# Patient Record
Sex: FEMALE | Race: WHITE
[De-identification: names, ages, dates, MRNs, and addresses within clinical notes are randomized per-mention and may not be internally consistent; named-entity substitution may affect disease eponyms.]

---

## 2019-04-02 ENCOUNTER — HOSPITAL ENCOUNTER (INPATIENT)
Dept: HOSPITAL 46 - ED | Age: 49
LOS: 2 days | Discharge: HOME | DRG: 415 | End: 2019-04-04
Attending: SURGERY | Admitting: SURGERY
Payer: COMMERCIAL

## 2019-04-02 VITALS — BODY MASS INDEX: 39.17 KG/M2 | HEIGHT: 66 IN | WEIGHT: 243.7 LBS

## 2019-04-02 DIAGNOSIS — K80.00: Primary | ICD-10-CM

## 2019-04-02 DIAGNOSIS — I97.191: ICD-10-CM

## 2019-04-02 DIAGNOSIS — Z53.31: ICD-10-CM

## 2019-04-02 DIAGNOSIS — E66.01: ICD-10-CM

## 2019-04-02 DIAGNOSIS — I48.1: ICD-10-CM

## 2019-04-02 DIAGNOSIS — E55.9: ICD-10-CM

## 2019-04-02 DIAGNOSIS — Z79.899: ICD-10-CM

## 2019-04-02 DIAGNOSIS — E87.6: ICD-10-CM

## 2019-04-02 DIAGNOSIS — E83.42: ICD-10-CM

## 2019-04-02 DIAGNOSIS — K76.0: ICD-10-CM

## 2019-04-02 DIAGNOSIS — Z82.49: ICD-10-CM

## 2019-04-02 DIAGNOSIS — E03.9: ICD-10-CM

## 2019-04-02 PROCEDURE — 0FJ44ZZ INSPECTION OF GALLBLADDER, PERCUTANEOUS ENDOSCOPIC APPROACH: ICD-10-PCS | Performed by: SURGERY

## 2019-04-02 PROCEDURE — G0378 HOSPITAL OBSERVATION PER HR: HCPCS

## 2019-04-02 PROCEDURE — BF101ZZ FLUOROSCOPY OF BILE DUCTS USING LOW OSMOLAR CONTRAST: ICD-10-PCS | Performed by: SURGERY

## 2019-04-02 PROCEDURE — 0FT40ZZ RESECTION OF GALLBLADDER, OPEN APPROACH: ICD-10-PCS | Performed by: SURGERY

## 2019-04-02 NOTE — NUR
PT TO THE FLOOR, C/O 7/10 PAIN IN RUQ, PT STATED THAT SHE HAD AN EPISODE OF
EMESIS AS WELL, GREEN BILE NOTED, PT STATES HER NAUSEA "COMES AND GOES" PT
GIVEN PRN PAIN MEDICATION PER EMAR, TOLERATED WELL. LS CLEAR, HEART SOUNDS
REGULAR, BT HYPOACTIVE, NO C/O SOB/CP, VSS. IV FLUSHES WELL.

## 2019-04-02 NOTE — NUR
04/02/19 1710 Tiffanie Kaiser 1613 PT ARRIVED IN PACU SLEEPY AND PLACED ON HEART MONITOR SHOWING
A-FIB. SPOKE WITH DR AND NEW ORDERS RECEIVED. 1630 EKG DONE BY RT. PT
STATES "I FEEL BETTER NOW. MY PAIN IS A 2/10, BEFORE SURGERY IT WAS AN
8/10." 1635 LABS DRAWN. PT AWAKENS AND FALLS BACK TO SLEEP. 1645
PILLOW TO ABD TO HELP SPLINT WHEN COUGHING. PT RELAXED TALKING TO
STAFF. 1700 TO ROOM 114. REPORT GIVEN TO COLTEN MORRELL.

## 2019-04-02 NOTE — DS
[facility]
                                    [address], [place]  [address]
_________________________________________________________________________________________
                                                                 Draft    
 
 
ADMISSION DATE:  04/02/2019
 
DISCHARGE DATE:  04/04/2019
 
REASON FOR ADMISSION:
This obese 49-year-old white woman works as a  at [employer].
She has had episodes of right upper abdominal pain episodically for several months and
more recently for two continuous weeks .  In the preceding several hours prior to
presentation to the emergency room, she had unrelenting right subcostal pain and
presented to the emergency room where she was evaluated by Dr. Isbell.  This included a
gallbladder ultrasound confirming a very thickened gallbladder wall, gallstone or
gallstones, and findings consistent with acute calculous cholecystitis.  She is admitted
for further evaluation and care. 
 
PERTINENT PHYSICAL EXAM:
GENERAL:  Showed an obese white woman who was quite uncomfortable.  She had a persistent
nausea and some vomiting. 
HEENT:  Trachea is midline.  Mucous membranes were dry. 
CHEST:  Clear. 
HEART:  Regular without murmur. 
ABDOMEN:  Nondistended.  There is marked tenderness in right subcostal area. 
EXTREMITIES:  Showed no clubbing, cyanosis, or edema.
 
LABORATORY STUDIES:
Showed normal electrolytes.  White count 10, hematocrit 40.9, platelets 343,000.
Alkaline phosphatase 99, bilirubin 0.5, AST 18, ALT 17.  Ultrasound did confirm a very
thick walled gallbladder and marked chatter related to gallstone or gallstones and fatty
infiltration of the liver. 
 
HOSPITAL COURSE:
She was aggressively fluid resuscitated, given antibiotics and parenteral pain
medication.  Despite this, she was quite uncomfortable.  On April 2, 2019, she underwent
laparoscopy with attempted cholecystectomy, however, profound inflammation was noted and
minimal decompression of the gallbladder could be obtained laparoscopically.  The
gallbladder could not even be grasped or elevated.  Ultimately, conversion to open
operation was undertaken.  There she was found to have a very thickened gallbladder
wall.  Inflammation was quite profound and a gallstone was at least 5 cm in size
obstructing much of the gallbladder and wedged into the infundibulum.  Cholangiogram was
found to be normal.  The operation was prolonged, complicated, and difficult based on
the extent of inflammation, her obesity, and other factors. 
 
A drain was placed. 
 
                                                                                    
_________________________________________________________________________________________
PATIENT NAME:     SLAVADOR RUIZ                    
MEDICAL RECORD #: [id number]            DISCHARGE SUMMARY             
          ACCT #: [id number]  
DATE OF BIRTH:   [birth year]            REPORT #: [id number]      
PHYSICIAN:        HANNAH VALENCIA MD                 
PCP:              NICK MONTEIRO MD           
REPORT IS CONFIDENTIAL AND NOT TO BE RELEASED WITHOUT AUTHORIZATION
 
 
                                  [facility]
                                    [address], [place]  [address]
_________________________________________________________________________________________
                                                                 Draft    
 
 
 
Postoperatively, she had remarkable improvement of her complaint of pain and tenderness.
 The drain showed only serosanguineous fluid.  Notably, through the course of operation,
she developed atrial fibrillation.  This was not particularly a rapid ventricular rate,
only in the 70s to 80s actually.  Consultation was undertaken with Dr. Rebolledo the
following morning after improving her electrolytes including hypomagnesemia and
low-grade hypokalemia.  This did not allow for conversion to a sinus rhythm.  She was
asymptomatic with it. 
 
She is noted to have extensive family history of cardiac problems including myocardial
infarction and many family members with chronic atrial fibrillation.  On that basis,
metoprolol-XL 25 mg daily was initiated as was aspirin 325 mg daily. 
 
Her heart rate was controlled in the mid 80s, having been as high as 110 on telemetry
postop. 
 
Her drain was removed prior to discharge.  At the time of discharge, she is ambulating
well, tolerating a regular diet, has marked improvement of her symptoms of pain and
doing well.  She is anticipating to follow up with her primary provider, LINDA Chun, on Monday.  Dr. Rebolledo the hospitalist has advised her to follow up as regard to a
cardiac situation with her.  Whether or not cardioversion would be anticipated would be
dependent on a cardiologist's intervention I am sure. 
 
She is asked to avoid lifting more than 20 pounds for the next month based on her
incisions.  She is permitted to shower.  She will keep Steri-Strips on the incisions.
The drain has been removed. 
 
DISCHARGE MEDICATIONS:
Will include:
1. Toprol XL 25 mg p.o. daily #90.
2. Aspirin 325 mg one tablet p.o. daily #90.
3. Ibuprofen 600 mg p.o. q.6 hours p.r.n. moderate-to-severe abdominal pain.
4. Percocet 7.5/325 one tab p.o. q.4 hours p.r.n. pain #6.
5. Tylenol 325 mg two tablets p.o. q.6 hours p.r.n. moderate pain instead of Percocet.
6. Continue Synthroid 125 mcg p.o. daily.
7. Vitamin D3 50,000 unit capsule weekly.
8. Prozac 20 mg p.o. daily.
 
DISCHARGE DIAGNOSES:
1. Severe calculous acute occult cholecystitis, status post laparoscopy with conversion
to open cholecystectomy with intraoperative cholangiogram on April 2, 2019. 
2. Obesity.
 
                                                                                    
_________________________________________________________________________________________
PATIENT NAME:     SALVADOR RUIZ                    
MEDICAL RECORD #: [id number]            DISCHARGE SUMMARY             
          ACCT #: [id number]  
DATE OF BIRTH:   [birth year]            REPORT #: [id number]      
PHYSICIAN:        HANNAH VALENCIA MD                 
PCP:              NICK MONTEIRO MD           
REPORT IS CONFIDENTIAL AND NOT TO BE RELEASED WITHOUT AUTHORIZATION
 
 
                                  [facility]
                                    [address]  [address]
_________________________________________________________________________________________
                                                                 Draft    
 
 
3. Postoperative development of persistent atrial fibrillation, unchanged despite
electrolyte correction. 
4. Hypothyroidism.
5. Vitamin D deficiency.
 
FOLLOWUP PLAN:
She is return to see me in approximately 4 weeks.  She should lift no more than 20
pounds 
in the next 4 weeks.  She is permitted to shower and will keep Steri-Strips on.  She is
planning to see her primary care provider on Monday this coming, LINDA Chun. 
 
 
 
            ________________________________________
            MD CELIA Willingham/NELLY
Job #:  [id number]
DD:  04/04/2019 11:14:14
DT:  04/04/2019 18:20:15
 
cc:            LINDA Chun DO
 
 
Copies:  ANEESH ISBELL DO
~
 
 
 
 
 
 
 
 
 
 
 
 
 
 
                                                                                    
_________________________________________________________________________________________
PATIENT NAME:     SALVADOR RUIZ                    
MEDICAL RECORD #: [id number]            DISCHARGE SUMMARY             
          ACCT #: [id number]  
DATE OF BIRTH:   [birth year]            REPORT #: [id number]      
PHYSICIAN:        HANNAH VALENCIA MD                 
PCP:              NICK MONTEIRO MD           
REPORT IS CONFIDENTIAL AND NOT TO BE RELEASED WITHOUT AUTHORIZATION

## 2019-04-02 NOTE — NUR
PT GIVEN PRN MORPHINE FOR 7/10 PAIN, PT CONTINUES TO HAVE EMESIS AS WELL AS
NAUSEA, DR. VALENCIA NOTIFIED AND RECEIVED NEW ORDER FOR PHENERGAN PRN 12.5MG IV
Q6. REPORT GIVEN TO JORDAN ABEL, QUESTIONS ANSWERED.

## 2019-04-02 NOTE — NUR
PT RESTLESS IN BED, TAKING DEEP BREATHS, GRIMACING. RATING PAIN 8/10.
MEDICATED WITH 4MG IV MORPHINE. REPORTS NAUSEA AT THIS TIME BUT STATES "WHEN
THE PAIN GETS BAD IS MAKES ME NAUSEOUS." DENIED NEED FOR ANTIEMETIC AT THIS
TIME STATING THAT THE NAUSEA WILL GET BETTER AFTER PAIN MEDICATION KICKS IN.
 AT BEDSIDE. PT HAS REMAINED NPO. IV INFUSING WNL, FLUSHES EASILY,
DRESSING CDI. CALL LIGHT WITHIN REACH.

## 2019-04-02 NOTE — NUR
PT RESTING IN BED WATCHING TV. VERY DROWSY BUT ORIENTED TO ALL. DENIES PAIN OR
NAUSEA. INCISION TO RIGHT LOWER QUADRANT COVERED WITH MEPILEX AND OPSITE. HAMZAH
SITE TO RLQ, DRESSING CDI, PUTTING OUT SEROSANGUINOUS DRAINAGE. UMBILICAL LAP
SITE WITH STERI STRIPS, SCANT AMOUNT OF BRIGHT RED BLOOD. TELE MONITOR ON, HR
70'S AND IRREGULAR. PT DENIES CHEST PAIN OR DISOMFORT, DENIES DIZZINESS OR
LIGHT HEADEDNESS. PULSE OX ON, SATTING 90-95% ON 2LNC. EDUCATED ON USE OF
I.S.  AT BEDSIDE. CALL LIGHT WITHIN REACH.

## 2019-04-02 NOTE — HP
[facility]
                                    [address], [place]  [address]
_________________________________________________________________________________________
                                                                 Signed   
 
 
ADMISSION DATE:  
04/02/2019
 
REASON FOR ADMISSION:  
Acute calculous cholecystitis.
 
HISTORY OF PRESENT ILLNESS:  
This obese 49-year-old white woman works as a  at [employer].  She has had
episodes of right upper abdominal pain, which have lasted several hours previously, but
last night began at approximately and unrelenting.  She presented at approximately 2
a.m. to the emergency room, where she was evaluated by Dr. Gary.  Her clinical findings
were highly suggestive of cholecystitis and a gallbladder ultrasound was performed,
which confirmed a thick-walled gallbladder stone sludge and positive sonographic Hernandez
sign.  Fatty liver was noted as well.  She was admitted for further evaluation and care. 
 
Her pain has been rather significant and she has had associated nausea, which was nearly
intractable.  Phenergan has reduced her symptoms of nausea.  She has no prior history of
abdominal surgery, though does report prior history of tonsillectomy at age 12.  She is
postmenopausal.  She does have hypothyroidism and takes thyroid medication. 
 
REVIEW OF SYSTEMS:  
She denies any shortness of breath or chest pain.  She has had no dysphagia or dysuria.
Denies any hematemesis or blood per rectum. 
 
PHYSICAL EXAMINATION:
GENERAL:  A very uncomfortable appearing white woman accompanied by her  (a
patient of mine as well).  Mucous membranes are dry.  Trachea is midline.  She has no
carotid bruit. 
CHEST:  Clear. 
HEART:  Regular without murmur. 
ABDOMEN:  Nondistended, but somewhat obese.  She has tenderness in the epigastric and
right subcostal area. 
EXTREMITIES:  Show no clubbing, cyanosis, or edema. 
VITAL SIGNS:  On presentation at 0351 hours showed a temperature of 97.9, pulse 78,
respirations 16, blood pressure 138/93 with a pulse oximetry on room air of 96%. 
 
LABORATORY DATA:  
Showed normal electrolytes.  Creatinine 0.59.  White count 10.0, hematocrit 40.9,
platelets 343,000, alkaline phosphatase 99, bilirubin 0.5, AST 18, ALT 17.  The
ultrasound report has been reviewed and images have also been reviewed showing fatty
liver to be sure a thick-walled gallbladder with marked shadowing related to gallstones. 
 
 
    Electronically Signed By: HANNAH VALENCIA MD  04/02/19 1808
_________________________________________________________________________________________
PATIENT NAME:     SALVADOR RUIZ                    
MEDICAL RECORD #: [id number]            HISTORY AND PHYSICAL          
          ACCT #: [id number]  
DATE OF BIRTH:   [birth year]            REPORT #: [id number]      
PHYSICIAN:        HANNAH VALENCIA MD                 
PCP:              NICK MONTEIRO MD           
REPORT IS CONFIDENTIAL AND NOT TO BE RELEASED WITHOUT AUTHORIZATION
 
 
                                  [facility]
                                    [address], [place]  [address]
_________________________________________________________________________________________
                                                                 Signed   
 
 
ASSESSMENT:  
The patient has rather significant acute severe calculous cholecystitis.  She has had
recurrent bouts leading up to this time.  She needs additional fluids as she is
clinically dehydrated and ultimately cholecystectomy. 
 
I discussed with the patient and her  who attends to her the risk of bleeding,
infection, bile duct injury, need for open procedure, need for common duct exploration 
and other unforeseen complications.  They understand and wished to proceed.  We will
plan to do operation today hopefully. 
 
 
 
            ________________________________________
            MD CELIA Willingham/NELLY
Job #:  [id number]
DD:  04/02/2019 09:44:36
DT:  04/02/2019 16:34:06
 
 
Copies:                                
~
 
 
 
 
 
 
 
 
 
 
 
 
 
 
 
 
 
 
 
    Electronically Signed By: HANNAH VALENCIA MD  04/02/19 1808
_________________________________________________________________________________________
PATIENT NAME:     SALVADOR RUIZ                    
MEDICAL RECORD #: [id number]            HISTORY AND PHYSICAL          
          ACCT #: [id number]  
DATE OF BIRTH:   [birth year]            REPORT #: [id number]      
PHYSICIAN:        HANNAH VALENCIA MD                 
PCP:              NICK MONTEIRO MD           
REPORT IS CONFIDENTIAL AND NOT TO BE RELEASED WITHOUT AUTHORIZATION

## 2019-04-02 NOTE — NUR
MEDICATED WITH 2 PERCOCET 7.5MG PER ABD PAIN 3/10. O2 IN PLACE 2L. IVF
INFUSING, SCDS IN PLACE.TOLERATING FLUIDS WELL FAMILY AT [facility]

## 2019-04-02 NOTE — NUR
PT ADMITTED TO ROOM 114 FROM ED.  SELF TRANSFERED FROM STRETCHER TO BED,
MOANING, DID VOMIT SMALL AMOUNT OF GREEN LIQUID.  STATES SHE HAS BEEN HAVING
STOMACH PAIN FOR "2 WEEKS".  A/O, ROOM AIR.  RESTLESS IN BED.  DENIED NEED TO
USE THE BATHROOM, REQUESTED AND RECEIVED THE REMOTE FOR THE TV/CALL LIGHT.

## 2019-04-02 NOTE — NUR
PT C/O 8/10 PAIN IN RUQ, GIVEN 4MG MORPHINE IVP FOR PRN PAIN, IV FLUIDS
INFUSING PER EMAR WNL, CALL LIGHT WITHIN REACH.

## 2019-04-02 NOTE — NUR
PT GIVEN ZOFRAN PER EMAR FOR NAUSEA/VOMITING, IV FLUIDS INFUSING PER EMAR WNL,
PT CONTINUES TO C/O PAIN IN RUQ, WILL GIVE PRN PAIN MEDICATION, CALL LIGHT
WITHIN REACH.

## 2019-04-02 NOTE — NUR
PT CONT TO RATE PAIN 8/10. MEDICATED WITH IV MORPHINE. PT ANXIOUS IN BED,
MOANING, RESTLESS.  AT BEDSIDE. CALL LIGHT WITHIN REACH.

## 2019-04-02 NOTE — NUR
PT SITTING UP IN BED VISITING WITH FAMILY. DENIES PAIN OR OTHER CONCERNS AT
THIS TIME. ATE SOME JELLO AND JOSSELINE WATER, DOES NOT WANT TO EAT ANYTHING ELSE AT
THIS TIME. CALL LIGHT WITHIN REACH.

## 2019-04-02 NOTE — NUR
PT UP TO BR W MINIMUM OF HELP, VOIDED, BACK TO BED, TOLERATED WELL. COOP WITH
ASSESSMENT. O2 2L NC, CPOX 98%. NO RESP DISTRESS PRESENT WITH EXERTION. ABD
ROSADO, TENDER TO TOUCH, LAP SITES IN PLACE, R MEPILEX INPLACE, CDI, HAMZAH WITH
SANGUINEOUS DRAINAGE PRESENT. SCDS IN PLACE. NO FURTHER C/O PAIN AT THIS TIME.
TOLERATING FLUIDS WELL, CALL LIGHT AT BEDSIDE

## 2019-04-03 NOTE — NUR
PT HAS REDDENED/RASH AREAS TO FOREARMS, AREAS "ITCHY".  NOTIFIED DR. VALENCIA VIA
TELEPHONE.  NOTIFIED HIM THAT PT DID BEGIN TAKING TOPROL XL THIS SHIFT.
RECIEVED TORB FOR BENADRYL 25 MG PO Q 6 HOURS PRN RASH OR ITCHING.  NOTIFIED
DR. BOLES OF ABOVE NOTED VIA TELEPHONE.

## 2019-04-03 NOTE — NUR
PT C/O 5/10 PAIN TO RIGHT ABDOMEN.  GAVE PERCOCET 7.5/325 MG 1 TAB PO PRN.  PT
ATE 75% OF DINNER.  TOLERATED WELL.  VISITING WITH FAMILY AT BEDSIDE.

## 2019-04-03 NOTE — NUR
PT IN BED, RATES PAIN 2/10, REPORTS THIS IS TOLERABLE LEVEL OF PAIN.  MEPILEX
AND OPSITE DRESSING TO RIGHT ABDOMEN C/D/I.  SMALL AMOUNT OF SEROSANGUAINOUS
DRAINAGE IN HAMZAH DRAIN.  PT DENIES PASSING FLATUS YET SINCE SURGERY, BUT BOWEL
TONES ARE NOW ACTIVE X 4 QUADRANTS.

## 2019-04-03 NOTE — NUR
PT ENCOURAGED TO GET UP TO GO FOR WALK IN HALLS.  PT REPORTED PAIN TO ABDOMEN
5/10, REQUESTED PAIN MEDICATION.  GAVE PERCOCET 7.5/325 MG 1 TAB PO PRN.  LAB
IN TO DRAW BLOOD FOR ORDERED LABS.

## 2019-04-03 NOTE — NUR
PT IN BED SLEEPING SOUNDLY.  NO S/S DISTRESS OR DISCOMFORT.  PERSONAL SUPPLIES
AND CALL LIGHT IN REACH.

## 2019-04-03 NOTE — NUR
GAVE PT BENADRYL 25 MG PO PRN FOR REDDENED AREA THAT PT C/O FEELING ITCHY TO
BACKS OF FOREARMS.  PT IN BED.  SCDs APPLIED.  PERSONAL SUPPLIES AND CALL
LIGHT IN REACH.

## 2019-04-03 NOTE — NUR
[employer] OFFERED PATIENT DOES WANT A SHOWER LATER TODAY. CALL LIGHT IN REACH.
NO FURTHER NEEDS AT THIS TIME.

## 2019-04-03 NOTE — NUR
DR. VALENCIA IN TO SEE PT, DISCUSSED PLAN OF CARE, PLAN FOR DR. BOLES TO CONSULT
REGARDING AFIB.  PT VERBALIZED UNDERSTANDING.
PT C/O 3/10 PAIN TO ABDOMEN, RUQ INCISIONAL AREA.  GAVE PERCOCET 7.5/325 MG 1
TAB PO PRN.

## 2019-04-03 NOTE — NUR
PT IN BED, AWAKE, ALERT, ORIENTED X 4.  REPORTED PAIN TO ABDOMEN TOLERABLE AT
THIS TIME.  HAMZAH DRAIN EMPTIED, 40 ML SEROSANGUAINOUS DRAINAGE OUT.  RUQ/RLQ
DRESSING WNL, C/D/I.  SCOPE SITE TO UMBILICUS UNCHANGED, WNL.

## 2019-04-03 NOTE — NUR
PT UP, AMBULATED IN HALLWAY, AROUND LARGE "LOOP" X 2, TOLERATED VERY WELL.  PT
ENCOURAGED TO SIT UP IN RECLINER AFTER WALK, PT DECLINED, IS SITTING UP IN BED
NOW.  PERSONAL SUPPLIES AND CALL LIGHT IN REACH.  FAMILY AT BEDSIDE.

## 2019-04-03 NOTE — NUR
resting, eyes closed, O2 in place. cpox in place, sats 98%. no c/o pain or
n/v.
Call light and fluids at bedside

## 2019-04-03 NOTE — NUR
PT SITTING UP IN BED, AWAKE, ALERT.  STERI STIPS TO SCOPE SITE AT UMBILICUS
HAVE OLD DRY BLOODY DRAINAGE, INTACT.  MEPILEX WITH OPSITE TO RUQ, RLQ C/D/I.
GAUZE COVERING HAMZAH DRAING INSERTION SITE HAS SMALL AMOUNT OF DRY DARK RED
DRAINAGE.  MODERATE AMOUNT OF SEROSANGUAINOUS DRAINAGE IN HAMZAH DRAIN, HAMZAH TUBING
STRIPPED.  PT REPORTED THAT PAIN IS TOLERABLE AT REST AT THIS TIME.  PT ALERT,
ORIENTED X 4.

## 2019-04-03 NOTE — OR
[facility]
                                    [address], [place]  [address]
_________________________________________________________________________________________
                                                                 Signed   
 
 
DATE OF OPERATION:
04/02/2019
 
SURGEON:
Hannah Valencia MD
 
PREOPERATIVE DIAGNOSES:
1. Acute calculous cholecystitis.
2. Obesity.
 
POSTOPERATIVE DIAGNOSES:
1. Acute calculous cholecystitis.
2. Morbid obesity.
 
PROCEDURES:
1. Laparoscopy with attempted laparoscopic cholecystectomy with conversion to open
cholecystectomy with intraoperative cholangiogram, prolonged, complicated, difficult. 
2. Surgeon-directed fluoroscopy.
 
ANESTHESIA:
General endotracheal by Hannah Bojorquez CRNA.
 
INDICATION:
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
[employer].  She has had 4 days of continuous right upper abdominal pain and
had several episodes of upper abdominal pain over the past several weeks.  She presented
to the emergency room when she could no longer stand the pain in the early morning hours
where she was evaluated by Dr. Isbell and found to have findings consistent with acute
cholecystitis.  A gallbladder ultrasound confirmed markedly thickened gallbladder as
well as stones.  She was quite dehydrated and had protracted nausea and vomiting.  She
has been fluid resuscitated given intravenous antibiotics, parenteral pain medication,
and is now to undergo a laparoscopic cholecystectomy, possible open procedure.  She
understands as does her  the risks of bleeding, infection, bile duct injury, need
for open procedure, need for common duct exploration, and other unforeseen
complications.  She understands that and she wished to proceed. 
 
FINDINGS:
The gallbladder was impressively thickened and intensely inflamed.  It was not
perforated or anything of that sort.  Decompression was minimal from a laparoscopic
approach.  The gallbladder simply could not be dissected in a reasonable way with a
laparoscopic approach and therefore, conversion to open operation was undertaken.  The
operation was prolonged, complicated, and difficult lasting 3 times longer than normal
 
    Electronically Signed By: HANNAH VALENCIA MD  04/03/19 0858
_________________________________________________________________________________________
PATIENT NAME:     ASLVADOR RUIZ                    
MEDICAL RECORD #: [id number]            OPERATIVE REPORT              
          ACCT #: [id number]  
DATE OF BIRTH:   [birth year]            REPORT #: [id number]      
PHYSICIAN:        HANNAH VALENCIA MD                 
PCP:              NICK SILVA MD           
REPORT IS CONFIDENTIAL AND NOT TO BE RELEASED WITHOUT AUTHORIZATION
 
 
                                  [facility]
                                    [address], [place]  [address]
_________________________________________________________________________________________
                                                                 Signed   
 
 
cholecystectomy on the basis of the intense inflammation.  The gallbladder was excised
safely.  A very large stone probably 5 cm was noted and wedged in the lower part of the
gallbladder.  A cholangiogram was performed, which showed contrast in the common duct
and to the duodenum without sign of filling defect.  I did not prolong the efforts at
cholangiography to see more proximal biliary tree, though I am completely certain that
there was no tissue with it from a surgical standpoint. 
 
DESCRIPTION OF PROCEDURE:
The patient was brought to the operating room, given a general endotracheal anesthetic.
Preoperative antibiotic Ancef had been given.  Sequential compression device stockings
used and heparin subcutaneously administered.  After satisfactory general endotracheal
anesthesia, the abdomen was prepared with a chlorhexidine solution and draped sterilely.
 An infraumbilical incision was made and using an open Dominick cannula technique,
pneumoperitoneum was achieved at level 14 mmHg with carbon dioxide gas.  Intraabdominal
inspection showed no sign of ascites or carcinomatosis.  The gallbladder was visualized
and found to be intensely inflamed, somewhat pink in color and doughy in nature.  The
liver had mild fatty infiltration actually.  An epigastric port was placed as were two
subcostal ports, one in the midclavicular and the other in the anterior axillary line.
The gallbladder could not be grasped.  It was tense and doughy.  On that basis, a
laparoscopic trocar device was used to decompress it as much as possible.  Still, it
could not be easily grasped and elevated.  Examination of the infundibulum showed to be
contiguous with surrounding structures and mindful that a high probability of conversion
to open operation would be required.  I abandoned further attempts of laparoscopic
approach.  The trocars were removed.  The infraumbilical fascial incision reapproximated
with interrupted #0 Vicryl suture.  A subcostal incision was made on the right side
taking advantage of one of the trocar sites in the mid epigastric area.  Dissection was
carried through the subcutaneous tissue sharply.  Anterior rectus sheath, rectus muscle,
posterior rectus sheath, and its attendant peritoneum were incised with electrocautery.
A Bookwalter retractor was affixed to the table and exposure undertaken.  Laparotomy
packs were placed to isolate well the gallbladder.  The gallbladder was still doughy and
firm.  A pursestring suture of #3-0 Vicryl was made and incision made in the gallbladder
allowing for decompression of further bilious fluid.  Placement of a pool tip sucker
into the depths of the gallbladder showed at least one large stone that was wedged in
the infundibulum. 
 
Pursestring was secured and a ring clamp applied to the apex of the gallbladder.  Using
meticulous care and electrocautery, the peritoneum overlying the gallbladder was freed
anteriorly and posteriorly with meticulous care.  Kidney dissectors were used in the
infundibulum and the avascular plane was more fully encountered in the lower aspect.
Ultimately, the cystic duct was fully isolated and although it was somewhat dilated, was
not pathologic and was viable.  A right angle clamp was applied across gallbladder
cystic duct junction.  A small ray tonsil clamp applied to the cystic duct and the
 
    Electronically Signed By: HANNAH VALENCIA MD  04/03/19 0858
_________________________________________________________________________________________
PATIENT NAME:     SALVADOR RUIZ                    
MEDICAL RECORD #: [id number]            OPERATIVE REPORT              
          ACCT #: [id number]  
DATE OF BIRTH:   [birth year]            REPORT #: [id number]      
PHYSICIAN:        HANNAH VALENCIA MD                 
PCP:              NICK SILVA MD           
REPORT IS CONFIDENTIAL AND NOT TO BE RELEASED WITHOUT AUTHORIZATION
 
 
                                  [facility]
                                    [address], [place]  [address]
_________________________________________________________________________________________
                                                                 Signed   
 
 
cystic duct transected.  Small clips were then used to secure cystic arterial branches
as necessary. 
 
The gallbladder was opened on the back table and found to have impressive thickening of
the wall, a single large gallstone about 5 cm in length.  No sign of neoplasm of mucosa. 
 
Using Figueroa type cholangiocatheter, this had been anticipated for laparoscopic
cholecystectomy, intraoperative cholangiography was undertaken using surgeon-directed
fluoroscopy.  Free flow of contrast was noted in the cystic duct and into the common
bile duct with prompt emptying into the duodenum.  There was spillage at the insertion
site of the cholangiogram and retrograde filling of the common hepatic duct was not
forthcoming.  I did not want to prolong things any further than necessary as I was quite
certain there was no issue with more proximal biliary tree.  Catheter was removed and
the cystic duct was triply clipped with large clips.  Irrigation was undertaken.  There
was no sign of bile leak, bleeding, or other problems.  Plans were made for closure.
The posterior rectus sheath was reapproximated with running #1 PDS suture after first
placing a 7 mm flat Chris drain in the subhepatic space and out a right-sided previous
trocar site.  It was secured to the skin with nylon suture.  The drain had been
manipulated in the subhepatic space.  Once posterior sheath was reapproximated and
muscle layer irrigated, the anterior rectus sheath was similarly reapproximated with
running PDS #1 in size and irrigation of the subcutaneous tissue and ultimately closure
of the skin with running subcuticular #3-0 Vicryl.  The infraumbilical incision was
similarly reapproximated with interrupted #3-0 Vicryl.  Steri-Strips were applied to the
wounds.  The drain was attached to bulb suction.  There was no sign of bile leak at this
point.  The patient has Mepilex silver sponge dressing was applied to the subcostal
incision.  The patient was ultimately extubated and transferred to recovery in good
condition having suffered no complication.  The operation was prolonged, complicated,
and difficult on the basis of her obesity and advanced nature of inflammation of the
gallbladder. 
 
 
 
            ________________________________________
            MD CELIA Willingham/MODL
Job #:  [id number]
DD:  04/02/2019 16:22:21
DT:  04/02/2019 23:07:37
 
cc:            Aneesh Isbell DO 
 
    Electronically Signed By: HANNAH VALENCIA MD  04/03/19 0858
_________________________________________________________________________________________
PATIENT NAME:     SALVADOR RUIZ                    
MEDICAL RECORD #: [id number]            OPERATIVE REPORT              
          ACCT #: [id number]  
DATE OF BIRTH:   [birth year]            REPORT #: [id number]      
PHYSICIAN:        HANNAH VALENCIA MD                 
PCP:              NICK SILVA MD           
REPORT IS CONFIDENTIAL AND NOT TO BE RELEASED WITHOUT AUTHORIZATION
 
 
                                  [place] [address] [place]  [address]
_________________________________________________________________________________________
                                                                 Signed   
 
 
 
               Nick Silva MD
 
 
Copies:  ANEESH ISBELL JONATHAN MD
~
 
 
 
 
 
 
 
 
 
 
 
 
 
 
 
 
 
 
 
 
 
 
 
 
 
 
 
 
 
 
 
 
 
 
 
 
    Electronically Signed By: HANNAH VALENCIA MD  04/03/19 0858
_________________________________________________________________________________________
PATIENT NAME:     SALVADOR RUIZ                    
MEDICAL RECORD #: [id number]            OPERATIVE REPORT              
          ACCT #: [id number]  
DATE OF BIRTH:   [birth year]            REPORT #: [id number]      
PHYSICIAN:        HANNAH VALENCIA MD                 
PCP:              NICK SILVA MD           
REPORT IS CONFIDENTIAL AND NOT TO BE RELEASED WITHOUT AUTHORIZATION

## 2019-04-03 NOTE — NUR
MEDICATED WITH 1 PERCOCET 7.5MG AND 1-6900MG PO MOTRIN PER C/O 3/10 ABD PAIN,
DENIES PASSING GAS AT THIS TIME. HAMZAH PATENT, IVF PATENT. ON ROOM AIR AT THIS
TIME. SATS 94%

## 2019-04-03 NOTE — NUR
rounded on pt to answer call light. sba pt to restroom. pt safely back to bed,
new ice pack provided per patient request, scds in place. call light within
reach. no more needs at this time.

## 2019-04-03 NOTE — EKG
[facility]
                                    [address]
                                  [place], [place]  [address]
_________________________________________________________________________________________
                                                                 Signed   
 
 
Atrial fibrillation
Cannot rule out Anterior infarct , age undetermined
Abnormal ECG
No previous ECGs available
Confirmed by MARANDA BOLES DO ([id number]) on 4/3/2019 3:32:07 PM
 
 
 
 
 
 
 
 
 
 
 
 
 
 
 
 
 
 
 
 
 
 
 
 
 
 
 
 
 
 
 
 
 
 
 
 
 
 
 
 
    Electronically Signed By: MARANDA BOLES DO  04/03/19 1532
_________________________________________________________________________________________
PATIENT NAME:     SALVADOR RUIZ                    
MEDICAL RECORD #: [id number]                     Electrocardiogram             
          ACCT #: [id number]  
DATE OF BIRTH:   [birth year]                                       
PHYSICIAN:   MARANDA BOLES DO                     REPORT #: [id number]
REPORT IS CONFIDENTIAL AND NOT TO BE RELEASED WITHOUT AUTHORIZATION

## 2019-04-03 NOTE — NUR
PT TOLERATING REGULAR DIET WELL, AT 75% OF DINNER.  MEPILEX WITH OP SITE TO
RIGHT ABDOMEN, C/D/I, HAMZAH DRAIN INSERTION SITE BELOW THIS, COVERED WITH GAUZE
AND OPSITE, HAS MODERATE AMOUNT OLD, DRY BLOOD ON GAUZE.  UMBILICAL SCOPE SITE
HAS STERI STRIPS IN PLACE, MODERATE AMOUNT DRY BLOODY DRAINAGE TO THESE.
BOWEL TONES NOW ACTIVE X 4 QUADRANTS, PT DENIES PASSING FLATUS.  PT TOOK
PERCOCET 7.5/325  MG PO PRN PAIN APROXIMATELY EVERY 4 HOURS.  PT STARTED
TAKING TOPROL XL AND ASPRIN FOR AFIB TODAY.  REMAINS ON TELE # 3, HR
IRREGULAR, AFIB, RATE CONTROLLED.  PT AMBULATED IN HALLS SEVERAL TIMES THIS
SHIFT, STEADY ON FEET, TOLERANCE GOOD.  VOIDING QUANTITY SUFFICIENT URINE.

## 2019-04-03 NOTE — NUR
PT HAS BEEN MEDICATED 2X PER ABD PAIN WTIH GOOD PAIN RELIEF. TOLERATING FLUIDS
WELL. NO C/O ADVERSE REACTION TO ABX. ABD SOFT, TENDER, OLU, DENIES PASSING
GAS. CPOX IN PLACE, WAS WEANED OFF POST OP OXYGEN, SATS 94% ON ROOM AIR. GETS
UP WITH 1 PA. NO C/O N/V. FLUIDS AND CALL LIGHT AT BEDSIDE

## 2019-04-03 NOTE — NUR
PATIENT HAD BEEN GIVEN 2 PERCOCET WITH HER REGULAR EVENING MEDICATIONS FOR
ABD PAIN OF 4/10, STILL SMALL AMOUNT OF SEROSANGUINOUS DRAINAGE COME OF THE HAMZAH
DRAIN. PAIN HAS BEEN FAIRLY WELL CONTROLLED TODAY. PATIENT IS SITTTING UP AND
WATCHING TV.

## 2019-04-04 NOTE — NUR
PATIENT'S DRESSING LOOKS GOOD,HAMZAH DRAINAGE IS MORE SEROUS THIS AM. PATIENT'S
PAIN HAS BEEN FAIRLY CONTROLLED THROUGH THE NIGHT WITH THE USE OF ONLY 3
PERCOCET THROUGH THE EVENING.

## 2019-04-04 NOTE — NUR
DISCHARGE INSTRUCTIONS COMPLETED WITH PT AND FAMILY. PT REQUESTING TO SHOWER
BEFORE LEAVING, SUPPLIES PROVIDED. IV CATH REMOVED. PT TO CALL WHEN SHOWER
COMPLETED.

## 2019-04-04 NOTE — NUR
PATIENT RESTING IN BED. VITAL SIGNS AND I&O DONE.CALL LIGHT WITHIN REACH. NO
OTHER NEEDS AT THIS TIME

## 2019-04-04 NOTE — NUR
PT CONTITNUES TO RATE PAIN 4-5/10, REQUESTING ADDITIONAL PAIN MEDICATION.
PROVIDED WITH 1 TAB PERCOCET  MG MORTIN. PT DENIES OTHER NEEDS AT THIS
TIME.

## 2019-04-04 NOTE — NUR
PT HAD JUST SHOWERED, DRESSED AND COMBING HAIR WITH FAMILY IN . SHE IS READY
FOR DC. EXPLAINED DC PROCESSED, NO QUESTIONS. EXTENDED A BLESSING, WILL FOLLOW
AS NEEDED

## 2019-04-04 NOTE — NUR
PT RETURNING TO BED FROM BATHROOM, AMBULATING INDEPENDENTLY. PT ON ROOM AIR,
LUNG SOUNDS CLEAR, DENIES SOB. PT COMPLAINT OF PAIN 4-5/10, GIVEN 1 TAB
PERCOCET. PT ON TELE #3, AFIB. PT TOLERATING REGULAR DIET, DENIES NAUSEA,
BOWEL TONES ACTIVE, PASSING FLATUS. CMS INTACT, TRACE EDEMA IN LOWER LEGS,
SCDS IN PLACE. IV SALINE LOCKED, FLUSHED, PATENT. DISCUSSED PLAN OF CARE FOR
THE DAY, INCLUDING WALKING IN [place]. PT DENIES OTHER NEEDS AT THIS TIME.

## 2019-04-04 NOTE — NUR
PATIENT SITTING UP IN BED. FAMILY IN ROOM. FINAL VITAL SIGNS AND I&O WERE
OBTAINED PRIOR TO DISCHARGE FROM THE UNIT

## 2019-04-04 NOTE — NUR
PATIENT SITTING UP IN BED. PATIENT FACE AND HANDS CLEANED. CALL LIGHT WITHIN
REACH. NO OTHER NEEDS AT THIS TIME